# Patient Record
Sex: MALE | Race: OTHER | NOT HISPANIC OR LATINO | URBAN - METROPOLITAN AREA
[De-identification: names, ages, dates, MRNs, and addresses within clinical notes are randomized per-mention and may not be internally consistent; named-entity substitution may affect disease eponyms.]

---

## 2017-06-02 ENCOUNTER — EMERGENCY (EMERGENCY)
Facility: HOSPITAL | Age: 52
LOS: 1 days | Discharge: HOME | End: 2017-06-02
Attending: EMERGENCY MEDICINE | Admitting: EMERGENCY MEDICINE
Payer: SELF-PAY

## 2017-06-02 VITALS
RESPIRATION RATE: 16 BRPM | OXYGEN SATURATION: 100 % | SYSTOLIC BLOOD PRESSURE: 143 MMHG | TEMPERATURE: 98 F | DIASTOLIC BLOOD PRESSURE: 93 MMHG | HEART RATE: 77 BPM

## 2017-06-02 PROCEDURE — 99282 EMERGENCY DEPT VISIT SF MDM: CPT

## 2017-06-02 NOTE — ED ADULT TRIAGE NOTE - CHIEF COMPLAINT QUOTE
Pt st" For 2 months I feel like something is growing in nostrils....saw a doctor there who was going to do surgery but I already booked flight here." Resp even & unlabored. Pt st" I feel congested....at first I thought it was a cold." "Just arrived from [facility] on Sunday."

## 2017-06-02 NOTE — ED PROVIDER NOTE - OBJECTIVE STATEMENT
52y/o M with no pertinent PMHx presents to the ED with sensation of a growth in his "nostril" for "months" with no improvement. Pt also reports congestion. He saw an ENT in his home country of [facility] and was supposed to get surgery. Pt came to [facility] for an unrelated issue, now at ED for evaluation. Denies other complaints.

## 2017-06-02 NOTE — ED PROVIDER NOTE - NS ED MD SCRIBE ATTENDING SCRIBE SECTIONS
HISTORY OF PRESENT ILLNESS/DISPOSITION/HIV/REVIEW OF SYSTEMS/PAST MEDICAL/SURGICAL/SOCIAL HISTORY/PHYSICAL EXAM/VITAL SIGNS( Pullset)

## 2017-06-02 NOTE — ED PROVIDER NOTE - MEDICAL DECISION MAKING DETAILS
50y/o M with no pertinent PMHx presents to the ED with growth in R nare for "months," possible polyp. Cannot r/o cancer, refer to ENT for f/u due to symptoms continuing for over 2mos. 52y/o M with no pertinent PMHx presents to the ED with growth in R nare for "months," possible polyp. Cannot r/o cancer, refer to ENT for f/u due to symptoms, no signs of respiratory distress or compromise.

## 2017-06-06 PROBLEM — Z00.00 ENCOUNTER FOR PREVENTIVE HEALTH EXAMINATION: Status: ACTIVE | Noted: 2017-06-06

## 2017-06-12 ENCOUNTER — EMERGENCY (EMERGENCY)
Facility: HOSPITAL | Age: 52
LOS: 1 days | Discharge: HOME | End: 2017-06-12
Admitting: EMERGENCY MEDICINE
Payer: SELF-PAY

## 2017-06-12 VITALS
WEIGHT: 149.91 LBS | HEART RATE: 84 BPM | RESPIRATION RATE: 15 BRPM | DIASTOLIC BLOOD PRESSURE: 89 MMHG | SYSTOLIC BLOOD PRESSURE: 149 MMHG | TEMPERATURE: 98 F | OXYGEN SATURATION: 100 %

## 2017-06-12 PROCEDURE — 99283 EMERGENCY DEPT VISIT LOW MDM: CPT

## 2017-06-12 NOTE — ED ADULT TRIAGE NOTE - CHIEF COMPLAINT QUOTE
pt is supposed to travel back home to [facility] on 6/19/17  never followed up w ENT,   back to re-eval nasal growth

## 2017-06-12 NOTE — ED PROVIDER NOTE - MEDICAL DECISION MAKING DETAILS
53 y/o M presents to ED for the 2nd time in 9 days for nasal growth without worsening sxs, no respiratory compromise, pt advised he needs to f/u with ENT clinic and referred to the contact number given on his previous d/c papers - discussed with pt's sister as well to aid in making him the appointment.

## 2017-06-12 NOTE — ED PROVIDER NOTE - OBJECTIVE STATEMENT
51 y/o M no PMH c/o right nasal growth for the past few months. Pt states he was here 9 days ago with same complaint, has a paper with him from previous ED visit stating to f/u with ENT with address and phone number to call for f/u ENT appt. Pt states he was not sure how to make the appointment so he came back here. States he did not attempt to contact ENT clinic from the number provided. States he had similar sxs years ago, saw an ENT in [facility] who removed growth. Denies worsening difficulty breathing, epistaxis, dysphagia, sore throat, fever.

## 2017-06-19 ENCOUNTER — APPOINTMENT (OUTPATIENT)
Dept: OTOLARYNGOLOGY | Facility: CLINIC | Age: 52
End: 2017-06-19
